# Patient Record
Sex: FEMALE | Race: WHITE | NOT HISPANIC OR LATINO | URBAN - METROPOLITAN AREA
[De-identification: names, ages, dates, MRNs, and addresses within clinical notes are randomized per-mention and may not be internally consistent; named-entity substitution may affect disease eponyms.]

---

## 2017-02-24 ENCOUNTER — IMPORTED ENCOUNTER (OUTPATIENT)
Dept: URBAN - METROPOLITAN AREA CLINIC 43 | Facility: CLINIC | Age: 82
End: 2017-02-24

## 2017-02-24 PROBLEM — H16.223: Noted: 2017-02-24

## 2017-03-02 ENCOUNTER — APPOINTMENT (RX ONLY)
Dept: URBAN - METROPOLITAN AREA CLINIC 129 | Facility: CLINIC | Age: 82
Setting detail: DERMATOLOGY
End: 2017-03-02

## 2017-03-02 DIAGNOSIS — L60.1 ONYCHOLYSIS: ICD-10-CM

## 2017-03-02 DIAGNOSIS — D485 NEOPLASM OF UNCERTAIN BEHAVIOR OF SKIN: ICD-10-CM

## 2017-03-02 PROBLEM — H91.90 UNSPECIFIED HEARING LOSS, UNSPECIFIED EAR: Status: ACTIVE | Noted: 2017-03-02

## 2017-03-02 PROBLEM — D48.5 NEOPLASM OF UNCERTAIN BEHAVIOR OF SKIN: Status: ACTIVE | Noted: 2017-03-02

## 2017-03-02 PROBLEM — L85.3 XEROSIS CUTIS: Status: ACTIVE | Noted: 2017-03-02

## 2017-03-02 PROBLEM — L29.8 OTHER PRURITUS: Status: ACTIVE | Noted: 2017-03-02

## 2017-03-02 PROBLEM — M12.9 ARTHROPATHY, UNSPECIFIED: Status: ACTIVE | Noted: 2017-03-02

## 2017-03-02 PROBLEM — Z85.828 PERSONAL HISTORY OF OTHER MALIGNANT NEOPLASM OF SKIN: Status: ACTIVE | Noted: 2017-03-02

## 2017-03-02 PROBLEM — I10 ESSENTIAL (PRIMARY) HYPERTENSION: Status: ACTIVE | Noted: 2017-03-02

## 2017-03-02 PROBLEM — Z92.3 PERSONAL HISTORY OF IRRADIATION: Status: ACTIVE | Noted: 2017-03-02

## 2017-03-02 PROBLEM — E03.9 HYPOTHYROIDISM, UNSPECIFIED: Status: ACTIVE | Noted: 2017-03-02

## 2017-03-02 PROBLEM — Z85.79 PERSONAL HISTORY OF OTHER MALIGNANT NEOPLASMS OF LYMPHOID, HEMATOPOIETIC AND RELATED TISSUES: Status: ACTIVE | Noted: 2017-03-02

## 2017-03-02 PROCEDURE — ? COUNSELING

## 2017-03-02 PROCEDURE — ? BIOPSY BY SHAVE METHOD

## 2017-03-02 PROCEDURE — 99202 OFFICE O/P NEW SF 15 MIN: CPT | Mod: 25

## 2017-03-02 PROCEDURE — 11100: CPT

## 2017-03-02 ASSESSMENT — LOCATION SIMPLE DESCRIPTION DERM
LOCATION SIMPLE: ABDOMEN
LOCATION SIMPLE: RIGHT GREAT TOE

## 2017-03-02 ASSESSMENT — LOCATION DETAILED DESCRIPTION DERM
LOCATION DETAILED: RIGHT GREAT TOENAIL
LOCATION DETAILED: LEFT LATERAL ABDOMEN

## 2017-03-02 ASSESSMENT — LOCATION ZONE DERM
LOCATION ZONE: TRUNK
LOCATION ZONE: TOENAIL

## 2017-03-02 NOTE — PROCEDURE: MIPS QUALITY
Quality 110: Preventive Care And Screening: Influenza Immunization: Influenza Immunization previously received during influenza season
Detail Level: Detailed
Quality 47: Advance Care Plan: Advance Care Planning discussed and documented in the medical record; patient did not wish or was not able to name a surrogate decision maker or provide an advance care plan.
Quality 111:Pneumonia Vaccination Status For Older Adults: Pneumococcal Vaccination Previously Received
Quality 131: Pain Assessment And Follow-Up: Pain assessment using a standardized tool is documented as negative, no follow-up plan required
Quality 130: Documentation Of Current Medications In The Medical Record: Current Medications Documented
Quality 226: Preventive Care And Screening: Tobacco Use: Screening And Cessation Intervention: Patient screened for tobacco and never smoked

## 2017-03-02 NOTE — PROCEDURE: BIOPSY BY SHAVE METHOD
Lab: [facility]
Post-Care Instructions: I reviewed with the patient in detail post-care instructions. Patient is to keep the biopsy site dry overnight, and then apply bacitracin twice daily until healed. Patient may apply hydrogen peroxide soaks to remove any crusting.
Silver Nitrate Text: The wound bed was treated with silver nitrate after the biopsy was performed.
Anesthesia Volume In Cc (Will Not Render If 0): 0.5
Bill For Surgical Tray: no
X Size Of Lesion In Cm: 0
Lab Facility: [id number]
Size Of Lesion In Cm: 2
Biopsy Method: Dermablade
Biopsy Type: H and E
Detail Level: Detailed
Wound Care: Vaseline
Cryotherapy Text: The wound bed was treated with cryotherapy after the biopsy was performed.
Body Location Override (Optional - Billing Will Still Be Based On Selected Body Map Location If Applicable): Left Abdomen
Type Of Destruction Used: Curettage
Notification Instructions: Patient will be notified of biopsy results. However, patient instructed to call the office if not contacted within 2 weeks.
Curettage Text: The wound bed was treated with curettage after the biopsy was performed.
Consent: Written consent was obtained and risks were reviewed including but not limited to scarring, infection, bleeding, scabbing, incomplete removal, nerve damage and allergy to anesthesia.
Dressing: bandage
Hemostasis: Aluminum Chloride
Anesthesia Type: 1% lidocaine with epinephrine
Billing Type: [employer]
Electrodesiccation Text: The wound bed was treated with electrodesiccation after the biopsy was performed.
Electrodesiccation And Curettage Text: The wound bed was treated with electrodesiccation and curettage after the biopsy was performed.

## 2017-03-10 ENCOUNTER — IMPORTED ENCOUNTER (OUTPATIENT)
Dept: URBAN - METROPOLITAN AREA CLINIC 43 | Facility: CLINIC | Age: 82
End: 2017-03-10

## 2020-04-19 ASSESSMENT — VISUAL ACUITY
OD_SC: 20/80 -2
OS_SC: J2
OS_CC: J1
OD_CC: J2
OD_SC: J7
OS_SC: 20/30 -2
OD_CC: 20/40 -2
OS_CC: 20/40 -2

## 2020-04-19 ASSESSMENT — KERATOMETRY
OS_AXISANGLE2_DEGREES: 170
OS_K2POWER_DIOPTERS: 42.5
OS_AXISANGLE_DEGREES: 80
OD_AXISANGLE2_DEGREES: 110
OD_AXISANGLE_DEGREES: 20
OD_K1POWER_DIOPTERS: 46.25
OD_K2POWER_DIOPTERS: 43.5
OS_K1POWER_DIOPTERS: 44

## 2020-04-19 ASSESSMENT — TONOMETRY
OS_IOP_MMHG: 17.0
OD_IOP_MMHG: 13.0

## 2022-06-04 ENCOUNTER — TELEPHONE ENCOUNTER (OUTPATIENT)
Dept: URBAN - METROPOLITAN AREA CLINIC 68 | Facility: CLINIC | Age: 87
End: 2022-06-04

## 2022-06-05 ENCOUNTER — TELEPHONE ENCOUNTER (OUTPATIENT)
Dept: URBAN - METROPOLITAN AREA CLINIC 68 | Facility: CLINIC | Age: 87
End: 2022-06-05

## 2022-06-05 RX ORDER — ACETAMINOPHEN 325 MG/1
TYLENOL( 325MG ORAL 650 MG PRN ) ACTIVE -HX ENTRY TABLET, FILM COATED ORAL PRN
Status: ACTIVE | COMMUNITY
Start: 2014-03-10

## 2022-06-05 RX ORDER — FERROUS SULFATE 325(65) MG
VITAMIN D3( 2000UNIT ORAL 2 TABS DAILY ) ACTIVE -HX ENTRY TABLET ORAL DAILY
Status: ACTIVE | COMMUNITY
Start: 2014-03-10

## 2022-06-05 RX ORDER — SULFAMETHOXAZOLE/TRIMETHOPRIM 800-160 MG
BACTRIM DS( 800-160MG ORAL  BID ON M,W,F ) ACTIVE -HX ENTRY TABLET ORAL
Status: ACTIVE | COMMUNITY
Start: 2014-03-10

## 2022-06-05 RX ORDER — GABAPENTIN 100 MG/1
GABAPENTIN( 100MG ORAL  BID ) ACTIVE -HX ENTRY CAPSULE ORAL BID
Status: ACTIVE | COMMUNITY
Start: 2014-03-10

## 2022-06-05 RX ORDER — DOCUSATE SODIUM 100 MG/1
COLACE( 100MG ORAL  PRN ) ACTIVE -HX ENTRY CAPSULE ORAL PRN
Status: ACTIVE | COMMUNITY
Start: 2014-03-10

## 2022-06-05 RX ORDER — MIDODRINE HYDROCHLORIDE 5 MG/1
MIDODRINE HCL( 5MG ORAL  TID ) ACTIVE -HX ENTRY TABLET ORAL TID
Status: ACTIVE | COMMUNITY
Start: 2014-03-10

## 2022-06-05 RX ORDER — HYDROCORTISONE ACETATE, PRAMOXINE HCL 2.5; 1 G/100G; G/100G
CREAM TOPICAL
Qty: 1 | Refills: 1 | Status: ACTIVE | COMMUNITY
Start: 2014-03-10

## 2022-06-05 RX ORDER — LEVOTHYROXINE SODIUM 100 UG/1
SYNTHROID( 100MCG ORAL  DAILY ) ACTIVE -HX ENTRY TABLET ORAL DAILY
Status: ACTIVE | COMMUNITY
Start: 2014-03-10

## 2022-06-05 RX ORDER — PREDNISONE 5 MG/1
PREDNISONE( 5MG ORAL  DAILY ) ACTIVE -HX ENTRY TABLET ORAL DAILY
Status: ACTIVE | COMMUNITY
Start: 2014-03-10

## 2022-06-05 RX ORDER — ACYCLOVIR 400 MG/1
ACYCLOVIR( 400MG ORAL  TID ) ACTIVE -HX ENTRY TABLET ORAL TID
Status: ACTIVE | COMMUNITY
Start: 2014-03-10

## 2022-06-05 RX ORDER — ZOLPIDEM TARTRATE 5 MG/1
AMBIEN( 5MG ORAL  DAILY ) ACTIVE -HX ENTRY TABLET, FILM COATED ORAL DAILY
Status: ACTIVE | COMMUNITY
Start: 2014-03-10

## 2022-06-05 RX ORDER — ONDANSETRON HYDROCHLORIDE 4 MG/1
ZOFRAN( 4MG ORAL  PRN ) ACTIVE -HX ENTRY TABLET, FILM COATED ORAL PRN
Status: ACTIVE | COMMUNITY
Start: 2014-03-10

## 2022-06-25 ENCOUNTER — TELEPHONE ENCOUNTER (OUTPATIENT)
Age: 87
End: 2022-06-25

## 2022-06-26 ENCOUNTER — TELEPHONE ENCOUNTER (OUTPATIENT)
Age: 87
End: 2022-06-26

## 2022-06-26 RX ORDER — GABAPENTIN 100 MG/1
GABAPENTIN( 100MG ORAL  BID ) ACTIVE -HX ENTRY CAPSULE ORAL BID
Status: ACTIVE | COMMUNITY
Start: 2014-03-10

## 2022-06-26 RX ORDER — ACETAMINOPHEN 325 MG/1
TYLENOL( 325MG ORAL 650 MG PRN ) ACTIVE -HX ENTRY TABLET, FILM COATED ORAL PRN
Status: ACTIVE | COMMUNITY
Start: 2014-03-10

## 2022-06-26 RX ORDER — ZOLPIDEM TARTRATE 5 MG
AMBIEN( 5MG ORAL  DAILY ) ACTIVE -HX ENTRY TABLET ORAL DAILY
Status: ACTIVE | COMMUNITY
Start: 2014-03-10

## 2022-06-26 RX ORDER — GLUCOSAMINE/MSM/CHONDROIT SULF 500-166.6
VITAMIN D3( 2000UNIT ORAL 2 TABS DAILY ) ACTIVE -HX ENTRY TABLET ORAL DAILY
Status: ACTIVE | COMMUNITY
Start: 2014-03-10

## 2022-06-26 RX ORDER — SULFAMETHOXAZOLE/TRIMETHOPRIM 800-160 MG
BACTRIM DS( 800-160MG ORAL  BID ON M,W,F ) ACTIVE -HX ENTRY TABLET ORAL
Status: ACTIVE | COMMUNITY
Start: 2014-03-10

## 2022-06-26 RX ORDER — DOCUSATE SODIUM 100 MG/1
COLACE( 100MG ORAL  PRN ) ACTIVE -HX ENTRY CAPSULE ORAL PRN
Status: ACTIVE | COMMUNITY
Start: 2014-03-10

## 2022-06-26 RX ORDER — CALCIUM CARBONATE/VITAMIN D3 600 MG-10
CALCIUM + D( 150-261-330MG-MG-IU ORAL  PRN ) ACTIVE -HX ENTRY TABLET ORAL PRN
Status: ACTIVE | COMMUNITY
Start: 2014-03-10

## 2022-06-26 RX ORDER — MIDODRINE HYDROCHLORIDE 5 MG/1
MIDODRINE HCL( 5MG ORAL  TID ) ACTIVE -HX ENTRY TABLET ORAL TID
Status: ACTIVE | COMMUNITY
Start: 2014-03-10

## 2022-06-26 RX ORDER — ACYCLOVIR 400 MG/1
ACYCLOVIR( 400MG ORAL  TID ) ACTIVE -HX ENTRY TABLET ORAL TID
Status: ACTIVE | COMMUNITY
Start: 2014-03-10

## 2022-06-26 RX ORDER — LEVOTHYROXINE SODIUM 100 MCG
SYNTHROID( 100MCG ORAL  DAILY ) ACTIVE -HX ENTRY TABLET ORAL DAILY
Status: ACTIVE | COMMUNITY
Start: 2014-03-10

## 2022-06-26 RX ORDER — PREDNISONE 5 MG/1
PREDNISONE( 5MG ORAL  DAILY ) ACTIVE -HX ENTRY TABLET ORAL DAILY
Status: ACTIVE | COMMUNITY
Start: 2014-03-10